# Patient Record
Sex: FEMALE | Race: NATIVE HAWAIIAN OR OTHER PACIFIC ISLANDER | HISPANIC OR LATINO | URBAN - METROPOLITAN AREA
[De-identification: names, ages, dates, MRNs, and addresses within clinical notes are randomized per-mention and may not be internally consistent; named-entity substitution may affect disease eponyms.]

---

## 2019-08-09 ENCOUNTER — EMERGENCY (EMERGENCY)
Facility: HOSPITAL | Age: 54
LOS: 1 days | Discharge: HOME | End: 2019-08-09
Admitting: EMERGENCY MEDICINE
Payer: OTHER MISCELLANEOUS

## 2019-08-09 VITALS
WEIGHT: 161.6 LBS | RESPIRATION RATE: 14 BRPM | OXYGEN SATURATION: 100 % | SYSTOLIC BLOOD PRESSURE: 111 MMHG | DIASTOLIC BLOOD PRESSURE: 71 MMHG | HEIGHT: 61 IN | TEMPERATURE: 99 F | HEART RATE: 92 BPM

## 2019-08-09 PROCEDURE — 73030 X-RAY EXAM OF SHOULDER: CPT

## 2019-08-09 PROCEDURE — 73030 X-RAY EXAM OF SHOULDER: CPT | Mod: 26

## 2019-08-09 PROCEDURE — 73030 X-RAY EXAM OF SHOULDER: CPT | Mod: 26,LT

## 2019-08-09 PROCEDURE — 99283 EMERGENCY DEPT VISIT LOW MDM: CPT | Mod: 25

## 2019-08-09 PROCEDURE — 99283 EMERGENCY DEPT VISIT LOW MDM: CPT

## 2019-08-09 RX ORDER — IBUPROFEN 200 MG
600 TABLET ORAL ONCE
Refills: 0 | Status: COMPLETED | OUTPATIENT
Start: 2019-08-09 | End: 2019-08-09

## 2019-08-09 RX ADMIN — Medication 600 MILLIGRAM(S): at 13:45

## 2019-08-09 NOTE — ED PROVIDER NOTE - OBJECTIVE STATEMENT
54 yo F with pmh of DM c/o L shoulder pain after lifting a heavy mattress at work yesterday. Pt felt a pull in the back of her shoulder. Denies numbness, tingling, cp, sob. Pain worse with movement of arm. Took tylenol with no relief.

## 2019-08-09 NOTE — ED PROVIDER NOTE - DIAGNOSTIC INTERPRETATION
ER PA: Sue Oliveros  shoulder xray INTERPRETATION:  no acute fracture; no soft tissue swelling noted; normal bony alignment.

## 2019-08-09 NOTE — ED ADULT NURSE NOTE - NSIMPLEMENTINTERV_GEN_ALL_ED
Implemented All Universal Safety Interventions:  [place] to call system. Call bell, personal items and telephone within reach. Instruct patient to call for assistance. Room bathroom lighting operational. Non-slip footwear when patient is off stretcher. Physically safe environment: no spills, clutter or unnecessary equipment. Stretcher in lowest position, wheels locked, appropriate side rails in place.

## 2019-08-09 NOTE — ED PROVIDER NOTE - PHYSICAL EXAMINATION
CONSTITUTIONAL: Well-appearing; well-nourished; in no apparent distress.   HEAD: Normocephalic; atraumatic.   EYES: PERRL; EOM intact; conjunctiva and sclera clear  ENT: normal nose; no rhinorrhea; normal pharynx with no erythema or lesions.   NECK: Supple; non-tender;   CARDIOVASCULAR: Normal S1, S2; no murmurs, rubs, or gallops. Regular rate and rhythm.   RESPIRATORY: Breathing easily; breath sounds clear and equal bilaterally; no wheezes, rhonchi, or rales.  MSK: +tenderness to posterior L shoulder, FROM   EXT: No cyanosis or edema; N/V intact  SKIN: Normal for age and race; warm; dry; good turgor; no apparent lesions or rash.

## 2019-08-09 NOTE — ED ADULT NURSE NOTE - OBJECTIVE STATEMENT
Patient is a 52yo female reporting left shoulder pain s/p lifting heavy mattress at work yesterday. Patient states she felt a pull while lifting it yesterday. Denies any numbness/tingling, full range of motion to left shoulder.

## 2019-08-09 NOTE — ED PROVIDER NOTE - CLINICAL SUMMARY MEDICAL DECISION MAKING FREE TEXT BOX
52 yo F with pmh of DM c/o L shoulder pain after lifting a heavy mattress at work yesterday. Pt felt a pull in the back of her shoulder. Denies numbness, tingling, cp, sob. Pain worse with movement of arm. Took tylenol with no relief. +tenderness to posterior L shoulder, FROM 54 yo F with pmh of DM c/o L shoulder pain after lifting a heavy mattress at work yesterday. Pt felt a pull in the back of her shoulder. Denies numbness, tingling, cp, sob. Pain worse with movement of arm. Took tylenol with no relief. +tenderness to posterior L shoulder, FROM. Xray unremarkable. Likely strain.

## 2019-08-14 DIAGNOSIS — S46.912A STRAIN OF UNSPECIFIED MUSCLE, FASCIA AND TENDON AT SHOULDER AND UPPER ARM LEVEL, LEFT ARM, INITIAL ENCOUNTER: ICD-10-CM
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
## 2019-08-14 DIAGNOSIS — M25.512 PAIN IN LEFT SHOULDER: ICD-10-CM
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
## 2022-09-28 NOTE — ED PROVIDER NOTE - NSFOLLOWUPINSTRUCTIONS_ED_ALL_ED_FT
Unable to leave vm   Strain    A strain is a stretch or tear in one of the muscles in your body. This is caused by an injury to the area such as a twisting mechanism. Symptoms include pain, swelling, or bruising. Rest that area over the next several days and slowly resume activity when tolerated. Ice can help with swelling and pain.     SEEK IMMEDIATE MEDICAL CARE IF YOU HAVE ANY OF THE FOLLOWING SYMPTOMS: worsening pain, inability to move that body part, numbness or tingling.

## 2024-10-02 NOTE — ED PROVIDER NOTE - CCCP TRG CHIEF CMPLNT
PT CALLED AND CX APPT 10/25/24 AND R/S 10/24/24 AT 1300 TO COINCIDE WITH ANOTHER APPT   shoulder pain/injury